# Patient Record
Sex: FEMALE | Race: ASIAN | Employment: FULL TIME | URBAN - METROPOLITAN AREA
[De-identification: names, ages, dates, MRNs, and addresses within clinical notes are randomized per-mention and may not be internally consistent; named-entity substitution may affect disease eponyms.]

---

## 2024-06-26 ENCOUNTER — OFFICE VISIT (OUTPATIENT)
Age: 29
End: 2024-06-26

## 2024-06-26 VITALS
RESPIRATION RATE: 18 BRPM | DIASTOLIC BLOOD PRESSURE: 69 MMHG | HEART RATE: 111 BPM | BODY MASS INDEX: 26.65 KG/M2 | WEIGHT: 150.4 LBS | OXYGEN SATURATION: 99 % | HEIGHT: 63 IN | SYSTOLIC BLOOD PRESSURE: 103 MMHG | TEMPERATURE: 99.1 F

## 2024-06-26 DIAGNOSIS — U07.1 COVID: Primary | ICD-10-CM

## 2024-06-26 LAB
Lab: ABNORMAL
PERFORMING INSTRUMENT: ABNORMAL
QC PASS/FAIL: ABNORMAL
SARS-COV-2, POC: DETECTED

## 2024-06-26 ASSESSMENT — ENCOUNTER SYMPTOMS
SORE THROAT: 0
NAUSEA: 0
SINUS PRESSURE: 0
DIARRHEA: 0
VOMITING: 0
COUGH: 1

## 2024-06-26 NOTE — PROGRESS NOTES
Subjective     Chief Complaint   Patient presents with    Fever    Chills    Cough     Onset of symptoms since yesterday.  *MOTHER IS BREASTFEEDING 5 MONTH OLD**       HPI 29-year-old female presents for cough going on for 2 days.  She has had a fever and chills as well.  No specific treatment and is currently breast-feeding.    History reviewed. No pertinent past medical history.    History reviewed. No pertinent surgical history.    History reviewed. No pertinent family history.    No Known Allergies    Social History     Tobacco Use    Smoking status: Never    Smokeless tobacco: Never   Vaping Use    Vaping Use: Never used   Substance Use Topics    Alcohol use: Never    Drug use: Never       Vitals:    06/26/24 1227   BP: 103/69   Pulse: (!) 111   Resp: 18   Temp: 99.1 °F (37.3 °C)   SpO2: 99%       Review of Systems   Constitutional:  Positive for chills and fever.   HENT:  Negative for congestion, sinus pressure and sore throat.    Respiratory:  Positive for cough.    Gastrointestinal:  Negative for diarrhea, nausea and vomiting.       Objective     Physical Exam  Vitals reviewed.   Constitutional:       Appearance: Normal appearance.   HENT:      Right Ear: Tympanic membrane normal.      Left Ear: Tympanic membrane normal.      Nose: Nose normal.      Mouth/Throat:      Mouth: Mucous membranes are moist.      Pharynx: Oropharynx is clear. No oropharyngeal exudate or posterior oropharyngeal erythema.   Eyes:      Extraocular Movements: Extraocular movements intact.      Conjunctiva/sclera: Conjunctivae normal.      Pupils: Pupils are equal, round, and reactive to light.   Cardiovascular:      Rate and Rhythm: Normal rate and regular rhythm.      Heart sounds: Normal heart sounds.   Pulmonary:      Effort: Pulmonary effort is normal.      Breath sounds: Normal breath sounds.   Lymphadenopathy:      Cervical: No cervical adenopathy.   Neurological:      General: No focal deficit present.      Mental Status: She